# Patient Record
Sex: FEMALE
[De-identification: names, ages, dates, MRNs, and addresses within clinical notes are randomized per-mention and may not be internally consistent; named-entity substitution may affect disease eponyms.]

---

## 2021-06-19 ENCOUNTER — TRANSCRIPTION ENCOUNTER (OUTPATIENT)
Age: 61
End: 2021-06-19

## 2022-08-07 ENCOUNTER — NON-APPOINTMENT (OUTPATIENT)
Age: 62
End: 2022-08-07

## 2022-08-13 ENCOUNTER — NON-APPOINTMENT (OUTPATIENT)
Age: 62
End: 2022-08-13

## 2023-09-20 ENCOUNTER — APPOINTMENT (OUTPATIENT)
Dept: ORTHOPEDIC SURGERY | Facility: CLINIC | Age: 63
End: 2023-09-20
Payer: COMMERCIAL

## 2023-09-20 VITALS — WEIGHT: 140 LBS | BODY MASS INDEX: 25.76 KG/M2 | HEIGHT: 62 IN

## 2023-09-20 DIAGNOSIS — M54.16 RADICULOPATHY, LUMBAR REGION: ICD-10-CM

## 2023-09-20 PROCEDURE — 72110 X-RAY EXAM L-2 SPINE 4/>VWS: CPT

## 2023-09-20 PROCEDURE — 99203 OFFICE O/P NEW LOW 30 MIN: CPT

## 2024-01-24 ENCOUNTER — APPOINTMENT (OUTPATIENT)
Dept: INTERNAL MEDICINE | Facility: CLINIC | Age: 64
End: 2024-01-24
Payer: MEDICARE

## 2024-01-24 VITALS
BODY MASS INDEX: 24.48 KG/M2 | TEMPERATURE: 98.2 F | WEIGHT: 133 LBS | HEART RATE: 82 BPM | SYSTOLIC BLOOD PRESSURE: 118 MMHG | DIASTOLIC BLOOD PRESSURE: 60 MMHG | RESPIRATION RATE: 15 BRPM | HEIGHT: 62 IN | OXYGEN SATURATION: 99 %

## 2024-01-24 DIAGNOSIS — Z12.83 ENCOUNTER FOR SCREENING FOR MALIGNANT NEOPLASM OF SKIN: ICD-10-CM

## 2024-01-24 DIAGNOSIS — Z12.4 ENCOUNTER FOR SCREENING FOR MALIGNANT NEOPLASM OF CERVIX: ICD-10-CM

## 2024-01-24 DIAGNOSIS — L30.9 DERMATITIS, UNSPECIFIED: ICD-10-CM

## 2024-01-24 DIAGNOSIS — Z00.00 ENCOUNTER FOR GENERAL ADULT MEDICAL EXAMINATION W/OUT ABNORMAL FINDINGS: ICD-10-CM

## 2024-01-24 DIAGNOSIS — H91.93 UNSPECIFIED HEARING LOSS, BILATERAL: ICD-10-CM

## 2024-01-24 DIAGNOSIS — Z23 ENCOUNTER FOR IMMUNIZATION: ICD-10-CM

## 2024-01-24 DIAGNOSIS — Z12.39 ENCOUNTER FOR OTHER SCREENING FOR MALIGNANT NEOPLASM OF BREAST: ICD-10-CM

## 2024-01-24 PROCEDURE — 90677 PCV20 VACCINE IM: CPT

## 2024-01-24 PROCEDURE — G0009: CPT

## 2024-01-24 PROCEDURE — 99213 OFFICE O/P EST LOW 20 MIN: CPT | Mod: 25

## 2024-01-24 PROCEDURE — G0402 INITIAL PREVENTIVE EXAM: CPT

## 2024-01-24 RX ORDER — METHYLPREDNISOLONE 4 MG/1
4 TABLET ORAL
Qty: 1 | Refills: 0 | Status: DISCONTINUED | COMMUNITY
Start: 2023-09-20 | End: 2024-01-24

## 2024-01-24 RX ORDER — HYDROCORTISONE 10 MG/G
1 CREAM TOPICAL
Qty: 1 | Refills: 0 | Status: ACTIVE | COMMUNITY
Start: 2024-01-24 | End: 1900-01-01

## 2024-01-24 NOTE — PHYSICAL EXAM
[Normal] : affect was normal and insight and judgment were intact [de-identified] : dry patches of skin on leg, arms,and body

## 2024-01-24 NOTE — REVIEW OF SYSTEMS
[Itching] : Itching [Mole Changes] : no mole changes [Nail Changes] : no nail changes [Hair Changes] : hair changes [Skin Rash] : skin rash [Negative] : Psychiatric [de-identified] : dry patches of eczema sparsely on different parts of body, hair thinning

## 2024-01-24 NOTE — PLAN
[FreeTextEntry1] : 64F PMH osteopenia presents for an annual physical exam.  # osteopenia - dexa in 2016 with osteopenia, T score -1.7 - was on alendronate 70mg weekly - no falls or fractures noted - recommended stopping alendronate and repeating dexa scan for now since fracture [id number] probability of major osteoporotic fracture <20 percent and 10-year probability of hip fracture <3 percent, we suggest not using pharmacologic therapy to prevent bone loss or fracture  - advised to take calcium and vitamin D for now and supplement with physical activity - f/u repeat dexa scan  # eczema - complaining of dry patches on skin -legs, arms, bod. Itchy and exacerbated in winter months. Consistent with eczema - rx sent for hydrocortisone cream - referred to derm for skin check  # hair thinning - has been on minoxidil 5mg daily for the past year with improvement - denies any chest pain, shortness of breath, palpitations - advised to not take oral minoxidil given age and side effects, suggested topical - patient agreeable - TSH normal in 8/2023  # fingertip paresthesia - 2-3 times a week at nights, lasting a few seconds to minutes - electrolytes, liver, and kidney function normal in 8/2023 - a1c normal  - on exam with no discoloration or swelling in fingers. - negative Tinel's sign - possible cutaneous nerve entrapment when patient sleeps - adjust sleeping position and continue to monitor  # screening - negative anxiety and depression screening - mammo in 2021 normal, ordered repeat mammo/breast us - pap 20 years ago normal - sent to gyn for one more - declines HIV/HCV/STD testing - colo reported normal last year - no falls in the past year noted - DEXA in 2016 with osteopenia - referred to derm for skin check - complains of some hearing loss in both years, likely sensorineural - audiology referral given - 8/2023 labs including cbc, cmp, a1c, tsh, lipids - normal  # vaccinations - covid with booster UTD - flu shot UTD - tdap reported 1/2024 - shingles x2 reported UTD - prevnar 20 vaccine today

## 2024-01-24 NOTE — HISTORY OF PRESENT ILLNESS
[de-identified] : 64F PMH osteopenia presents for an annual physical exam.  She is currently on low dose oral minoxidil for hair thinning with improvement and treatment dose of alendronate for osteopenia. She denies any falls or fractures at this time. She has a complaint of dry patches on skin -legs, arms, bod. Itchy and exacerbated in winter months. Consistent with eczema. Steroid creams have helped her in the past. She is also complaining of some mild bilateral hearing loss and would like to see an audiologist for testing. She has a complaint of fingertip numbness during the nights 2-3 times a week for the past year which last a few seconds to minutes as she is able to "shake it off."

## 2024-01-24 NOTE — HEALTH RISK ASSESSMENT
[Good] : ~his/her~  mood as  good [Yes] : Yes [4 or more  times a week (4 pts)] : 4 or more  times a week (4 points) [1 or 2 (0 pts)] : 1 or 2 (0 points) [Never (0 pts)] : Never (0 points) [No] : In the past 12 months have you used drugs other than those required for medical reasons? No [No falls in past year] : Patient reported no falls in the past year [0] : 2) Feeling down, depressed, or hopeless: Not at all (0) [PHQ-2 Negative - No further assessment needed] : PHQ-2 Negative - No further assessment needed [de-identified] : one glass of wine with dinner [Audit-CScore] : 4 [de-identified] : walking, 30 minutes gym in the morning before work [de-identified] : chicken, salads, fish [[id number]] : 0 [Patient reported mammogram was normal] : Patient reported mammogram was normal [Patient reported PAP Smear was normal] : Patient reported PAP Smear was normal [Patient reported bone density results were abnormal] : Patient reported bone density results were abnormal [Patient reported colonoscopy was normal] : Patient reported colonoscopy was normal [HIV test declined] : HIV test declined [Hepatitis C test declined] : Hepatitis C test declined [Change in mental status noted] : No change in mental status noted [Language] : denies difficulty with language [Behavior] : denies difficulty with behavior [Learning/Retaining New Information] : denies difficulty learning/retaining new information [Handling Complex Tasks] : denies difficulty handling complex tasks [Spatial Ability and Orientation] : denies difficulty with spatial ability and orientation [Reasoning] : denies difficulty with reasoning [None] : None [With Significant Other] : lives with significant other [Employed] : employed [] :  [Sexually Active] : sexually active [Fully functional (bathing, dressing, toileting, transferring, walking, feeding)] : Fully functional (bathing, dressing, toileting, transferring, walking, feeding) [Fully functional (using the telephone, shopping, preparing meals, housekeeping, doing laundry, using] : Fully functional and needs no help or supervision to perform IADLs (using the telephone, shopping, preparing meals, housekeeping, doing laundry, using transportation, managing medications and managing finances) [Reports changes in hearing] : Reports changes in hearing [Reports changes in vision] : Reports no changes in vision [Reports normal functional visual acuity (ie: able to read med bottle)] : Reports normal functional visual acuity [Reports changes in dental health] : Reports no changes in dental health [MammogramDate] : 11/21 [PapSmearDate] : 20 years ago [BoneDensityDate] : 9/16 [BoneDensityComments] : osteopenia [ColonoscopyDate] : 2023 [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

## 2024-01-24 NOTE — PAST MEDICAL HISTORY
[Surgical Menopause] : in surgical menopause [Menopause Age____] : age at menopause was [unfilled] [Approximately ___] : the LMP was approximately [unfilled]

## 2024-02-07 ENCOUNTER — APPOINTMENT (OUTPATIENT)
Dept: OBGYN | Facility: CLINIC | Age: 64
End: 2024-02-07
Payer: MEDICARE

## 2024-02-07 VITALS
HEIGHT: 62 IN | BODY MASS INDEX: 25.28 KG/M2 | SYSTOLIC BLOOD PRESSURE: 102 MMHG | DIASTOLIC BLOOD PRESSURE: 64 MMHG | WEIGHT: 137.38 LBS

## 2024-02-07 DIAGNOSIS — M85.9 DISORDER OF BONE DENSITY AND STRUCTURE, UNSPECIFIED: ICD-10-CM

## 2024-02-07 DIAGNOSIS — Z01.419 ENCOUNTER FOR GYNECOLOGICAL EXAMINATION (GENERAL) (ROUTINE) W/OUT ABNORMAL FINDINGS: ICD-10-CM

## 2024-02-07 DIAGNOSIS — Z78.9 OTHER SPECIFIED HEALTH STATUS: ICD-10-CM

## 2024-02-07 DIAGNOSIS — M85.80 OTHER SPECIFIED DISORDERS OF BONE DENSITY AND STRUCTURE, UNSPECIFIED SITE: ICD-10-CM

## 2024-02-07 PROCEDURE — 99387 INIT PM E/M NEW PAT 65+ YRS: CPT

## 2024-02-07 RX ORDER — ALENDRONATE SODIUM 70 MG/1
70 TABLET ORAL
Refills: 0 | Status: ACTIVE | COMMUNITY

## 2024-02-07 RX ORDER — MINOXIDIL 2.5 MG/1
TABLET ORAL
Refills: 0 | Status: ACTIVE | COMMUNITY

## 2024-02-07 NOTE — PHYSICAL EXAM
[Chaperone Present] : A chaperone was present in the examining room during all aspects of the physical examination [FreeTextEntry1] : Sunita DILLARD MA  [Appropriately responsive] : appropriately responsive [Alert] : alert [No Acute Distress] : no acute distress [Soft] : soft [Non-tender] : non-tender [Non-distended] : non-distended [Oriented x3] : oriented x3 [FreeTextEntry6] : unremarkable  [Examination Of The Breasts] : a normal appearance [No Masses] : no breast masses were palpable [Vulvar Atrophy] : vulvar atrophy [Labia Majora] : normal [Labia Minora] : normal [Normal] : normal [Atrophy] : atrophy [Absent] : absent [Uterine Adnexae] : non-palpable

## 2024-02-07 NOTE — HISTORY OF PRESENT ILLNESS
[postmenopausal] : postmenopausal [N] : Patient reports normal menses [Y] : Positive pregnancy history [Menarche Age: ____] : age at menarche was [unfilled] [No] : Patient does not have concerns regarding sex [LMPDate] : AGE 45 [PGHxTotal] : 2 [[facility] : 2 [[facility]] : 2 [FreeTextEntry1] : AGE 45

## 2024-02-07 NOTE — DISCUSSION/SUMMARY
[FreeTextEntry1] : Meseret is a 65 y/o   LMP  (s/p [facility]) who presents for annual exam. PCP: Ilana Osuna DO ([place])    #Well Woman Visit 1. Nutrition/Activity: The benefits of balanced diet and physical activity discussed with patient. Supplement use discussed.  2. Health Screening: She was informed of the benefits of a screening colonoscopy/DEXA/Mammo/Pap. - Cervix: We reviewed ASCCP/ACOG guidelines for pap smear screening. No cervix, not high risk, no history of abnormal paps, discussed guidelines and no indication for screening.  - Breast: Does not remember last mammo, has script from PCP for screening, will make appt.  - Colon: done in 2023, normal per patient.  - DEXA: +osteopenia about 2 yrs ago, due for a rescan, given script today, was taken off bisphosphonate by new PCP and continues only on Ca/Vit D supplementation.  3. Sex Health:  The importance of safe-sex practices was discussed with the patient. STD screening was offered to patient, she defers.  4. Contraception: does not desire contraception, counseled about safe sex practices and barrier protection use.  5. no hx of HTN/DM, last PCP visit last month   She verbalized understanding and agreement with above counseling regarding differential diagnosis, evaluation, and plan.  She was given time for questions/concerns which were all answered to her apparent satisfaction. All designated lab work for today drawn in office.   RTO 1yr for next GYN ANNUAL

## 2025-04-29 ENCOUNTER — APPOINTMENT (OUTPATIENT)
Dept: INTERNAL MEDICINE | Facility: CLINIC | Age: 65
End: 2025-04-29
Payer: MEDICARE

## 2025-04-29 ENCOUNTER — NON-APPOINTMENT (OUTPATIENT)
Age: 65
End: 2025-04-29

## 2025-04-29 VITALS
BODY MASS INDEX: 25.95 KG/M2 | TEMPERATURE: 97.3 F | OXYGEN SATURATION: 97 % | WEIGHT: 141 LBS | HEART RATE: 75 BPM | SYSTOLIC BLOOD PRESSURE: 118 MMHG | HEIGHT: 62 IN | DIASTOLIC BLOOD PRESSURE: 66 MMHG

## 2025-04-29 DIAGNOSIS — Z00.00 ENCOUNTER FOR GENERAL ADULT MEDICAL EXAMINATION W/OUT ABNORMAL FINDINGS: ICD-10-CM

## 2025-04-29 DIAGNOSIS — Z12.39 ENCOUNTER FOR OTHER SCREENING FOR MALIGNANT NEOPLASM OF BREAST: ICD-10-CM

## 2025-04-29 DIAGNOSIS — Z82.49 FAMILY HISTORY OF ISCHEMIC HEART DISEASE AND OTHER DISEASES OF THE CIRCULATORY SYSTEM: ICD-10-CM

## 2025-04-29 DIAGNOSIS — H91.93 UNSPECIFIED HEARING LOSS, BILATERAL: ICD-10-CM

## 2025-04-29 PROCEDURE — G0438: CPT

## 2025-04-29 PROCEDURE — 93000 ELECTROCARDIOGRAM COMPLETE: CPT

## 2025-04-30 PROBLEM — Z00.00 ANNUAL PHYSICAL EXAM: Status: ACTIVE | Noted: 2025-04-29

## 2025-04-30 RX ORDER — CHOLECALCIFEROL (VITAMIN D3) 25 MCG
TABLET ORAL
Refills: 0 | Status: ACTIVE | COMMUNITY

## 2025-04-30 RX ORDER — ACETAMINOPHEN 325 MG
TABLET ORAL
Refills: 0 | Status: ACTIVE | COMMUNITY

## 2025-04-30 RX ORDER — PSYLLIUM HUSK 0.4 G
CAPSULE ORAL
Refills: 0 | Status: ACTIVE | COMMUNITY

## 2025-04-30 RX ORDER — ASCORBIC ACID 125 MG
TABLET,CHEWABLE ORAL
Refills: 0 | Status: ACTIVE | COMMUNITY